# Patient Record
Sex: FEMALE | URBAN - METROPOLITAN AREA
[De-identification: names, ages, dates, MRNs, and addresses within clinical notes are randomized per-mention and may not be internally consistent; named-entity substitution may affect disease eponyms.]

---

## 2020-08-03 PROBLEM — M53.3 COCCYDYNIA: Status: ACTIVE | Noted: 2020-08-03

## 2020-11-23 ENCOUNTER — EVALUATION (OUTPATIENT)
Dept: PHYSICAL THERAPY | Age: 30
End: 2020-11-23
Payer: COMMERCIAL

## 2020-11-23 DIAGNOSIS — M70.62 TROCHANTERIC BURSITIS OF LEFT HIP: Primary | ICD-10-CM

## 2020-11-23 PROCEDURE — 97110 THERAPEUTIC EXERCISES: CPT | Performed by: PHYSICAL THERAPIST

## 2020-11-23 PROCEDURE — 97161 PT EVAL LOW COMPLEX 20 MIN: CPT | Performed by: PHYSICAL THERAPIST

## 2020-12-01 ENCOUNTER — OFFICE VISIT (OUTPATIENT)
Dept: PHYSICAL THERAPY | Age: 30
End: 2020-12-01
Payer: COMMERCIAL

## 2020-12-01 DIAGNOSIS — M70.62 TROCHANTERIC BURSITIS OF LEFT HIP: Primary | ICD-10-CM

## 2020-12-01 PROCEDURE — 97112 NEUROMUSCULAR REEDUCATION: CPT | Performed by: PHYSICAL THERAPIST

## 2020-12-01 PROCEDURE — 97140 MANUAL THERAPY 1/> REGIONS: CPT | Performed by: PHYSICAL THERAPIST

## 2020-12-01 PROCEDURE — 97110 THERAPEUTIC EXERCISES: CPT | Performed by: PHYSICAL THERAPIST

## 2020-12-03 ENCOUNTER — OFFICE VISIT (OUTPATIENT)
Dept: PHYSICAL THERAPY | Age: 30
End: 2020-12-03
Payer: COMMERCIAL

## 2020-12-03 DIAGNOSIS — M70.62 TROCHANTERIC BURSITIS OF LEFT HIP: Primary | ICD-10-CM

## 2020-12-03 PROCEDURE — 97112 NEUROMUSCULAR REEDUCATION: CPT | Performed by: PHYSICAL THERAPIST

## 2020-12-03 PROCEDURE — 97140 MANUAL THERAPY 1/> REGIONS: CPT | Performed by: PHYSICAL THERAPIST

## 2020-12-07 ENCOUNTER — OFFICE VISIT (OUTPATIENT)
Dept: PHYSICAL THERAPY | Age: 30
End: 2020-12-07
Payer: COMMERCIAL

## 2020-12-07 DIAGNOSIS — M70.62 TROCHANTERIC BURSITIS OF LEFT HIP: Primary | ICD-10-CM

## 2020-12-07 PROCEDURE — 97112 NEUROMUSCULAR REEDUCATION: CPT | Performed by: PHYSICAL THERAPIST

## 2020-12-07 PROCEDURE — 97140 MANUAL THERAPY 1/> REGIONS: CPT | Performed by: PHYSICAL THERAPIST

## 2020-12-07 PROCEDURE — 97110 THERAPEUTIC EXERCISES: CPT | Performed by: PHYSICAL THERAPIST

## 2020-12-09 ENCOUNTER — APPOINTMENT (OUTPATIENT)
Dept: PHYSICAL THERAPY | Age: 30
End: 2020-12-09
Payer: COMMERCIAL

## 2020-12-10 ENCOUNTER — OFFICE VISIT (OUTPATIENT)
Dept: PHYSICAL THERAPY | Age: 30
End: 2020-12-10
Payer: COMMERCIAL

## 2020-12-10 ENCOUNTER — TRANSCRIBE ORDERS (OUTPATIENT)
Dept: PHYSICAL THERAPY | Age: 30
End: 2020-12-10

## 2020-12-10 DIAGNOSIS — M70.62 TROCHANTERIC BURSITIS OF LEFT HIP: Primary | ICD-10-CM

## 2020-12-10 PROCEDURE — 97110 THERAPEUTIC EXERCISES: CPT | Performed by: PHYSICAL THERAPIST

## 2020-12-10 PROCEDURE — 97140 MANUAL THERAPY 1/> REGIONS: CPT | Performed by: PHYSICAL THERAPIST

## 2020-12-10 PROCEDURE — 97112 NEUROMUSCULAR REEDUCATION: CPT | Performed by: PHYSICAL THERAPIST

## 2020-12-14 ENCOUNTER — OFFICE VISIT (OUTPATIENT)
Dept: PHYSICAL THERAPY | Age: 30
End: 2020-12-14
Payer: COMMERCIAL

## 2020-12-14 DIAGNOSIS — M70.62 TROCHANTERIC BURSITIS OF LEFT HIP: Primary | ICD-10-CM

## 2020-12-14 PROCEDURE — 97140 MANUAL THERAPY 1/> REGIONS: CPT | Performed by: PHYSICAL THERAPIST

## 2020-12-14 PROCEDURE — 97110 THERAPEUTIC EXERCISES: CPT | Performed by: PHYSICAL THERAPIST

## 2020-12-14 PROCEDURE — 97112 NEUROMUSCULAR REEDUCATION: CPT | Performed by: PHYSICAL THERAPIST

## 2020-12-16 ENCOUNTER — OFFICE VISIT (OUTPATIENT)
Dept: PHYSICAL THERAPY | Age: 30
End: 2020-12-16
Payer: COMMERCIAL

## 2020-12-16 DIAGNOSIS — M70.62 TROCHANTERIC BURSITIS OF LEFT HIP: Primary | ICD-10-CM

## 2020-12-16 PROCEDURE — 97110 THERAPEUTIC EXERCISES: CPT | Performed by: PHYSICAL THERAPIST

## 2020-12-16 PROCEDURE — 97112 NEUROMUSCULAR REEDUCATION: CPT | Performed by: PHYSICAL THERAPIST

## 2020-12-21 ENCOUNTER — OFFICE VISIT (OUTPATIENT)
Dept: PHYSICAL THERAPY | Age: 30
End: 2020-12-21
Payer: COMMERCIAL

## 2020-12-21 DIAGNOSIS — M70.62 TROCHANTERIC BURSITIS OF LEFT HIP: Primary | ICD-10-CM

## 2020-12-21 PROCEDURE — 97112 NEUROMUSCULAR REEDUCATION: CPT | Performed by: PHYSICAL THERAPIST

## 2020-12-21 PROCEDURE — 97110 THERAPEUTIC EXERCISES: CPT | Performed by: PHYSICAL THERAPIST

## 2020-12-28 ENCOUNTER — APPOINTMENT (OUTPATIENT)
Dept: PHYSICAL THERAPY | Age: 30
End: 2020-12-28
Payer: COMMERCIAL

## 2020-12-29 ENCOUNTER — OFFICE VISIT (OUTPATIENT)
Dept: PHYSICAL THERAPY | Age: 30
End: 2020-12-29
Payer: COMMERCIAL

## 2020-12-29 DIAGNOSIS — M70.62 TROCHANTERIC BURSITIS OF LEFT HIP: Primary | ICD-10-CM

## 2020-12-29 PROCEDURE — 97110 THERAPEUTIC EXERCISES: CPT | Performed by: PHYSICAL THERAPIST

## 2020-12-29 PROCEDURE — 97112 NEUROMUSCULAR REEDUCATION: CPT | Performed by: PHYSICAL THERAPIST

## 2021-01-07 ENCOUNTER — OFFICE VISIT (OUTPATIENT)
Dept: PHYSICAL THERAPY | Age: 31
End: 2021-01-07
Payer: COMMERCIAL

## 2021-01-07 DIAGNOSIS — M70.62 TROCHANTERIC BURSITIS OF LEFT HIP: Primary | ICD-10-CM

## 2021-01-07 PROCEDURE — 97112 NEUROMUSCULAR REEDUCATION: CPT | Performed by: PHYSICAL THERAPIST

## 2021-01-07 PROCEDURE — 97140 MANUAL THERAPY 1/> REGIONS: CPT | Performed by: PHYSICAL THERAPIST

## 2021-01-07 PROCEDURE — 97110 THERAPEUTIC EXERCISES: CPT | Performed by: PHYSICAL THERAPIST

## 2021-01-07 NOTE — PROGRESS NOTES
Daily Note     Today's date: 2021  Patient name: Franklin Reyes  : 1990  MRN: [id number]  Referring provider: Valeria Rose DO  Dx:   Encounter Diagnosis     ICD-10-CM    1  Trochanteric bursitis of left hip  M70 62        Start Time: 1530  Stop Time: 1615  Total time in clinic (min): 45 minutes    Subjective: Pt reports cushion on hard surfaces which has helped  She reports now sitting more than prior which has caused symptoms to slightly return  Objective: See treatment diary below    Assessment: Tolerated treatment well  Reported hip fatigue post treatment  Reported reduced lateral hip discomfort following manual intervention trying to improve hip abduction     Plan: Continue per plan of care  Precautions: none     Manuals 12 29  12 21   IASTM Right sacrotuberous lig and left glute med      Anti-pron  tape      Prone hip ER iso  Medial AFJ mobs  FB    MWM hip a/p with lunge  FB                Neuro Re-Ed   12 21   TrA cuff       TrA BKFO cuff 3x10 3*10 3*10 BKFO    S/L 90/90 hip ER      TrA march cuff 3*10 3*10 3*10   Squat with EC 2*20  2*20   Standing 4 way hip TB 2*10 ea 2*10 RTB           Ther Ex   12 21   Bridge, clam       Pt ed  Quad alt arm/leg (with knee bent) 3*10, 3" 3*10, 3" ea  2*10, 3" ea     Pt edu  & assessment   FB   SI belt application  FB                       Ther Activity                    Gait Training                    Modalities

## 2021-01-12 ENCOUNTER — OFFICE VISIT (OUTPATIENT)
Dept: PHYSICAL THERAPY | Age: 31
End: 2021-01-12
Payer: COMMERCIAL

## 2021-01-12 DIAGNOSIS — M70.62 TROCHANTERIC BURSITIS OF LEFT HIP: Primary | ICD-10-CM

## 2021-01-12 PROCEDURE — 97112 NEUROMUSCULAR REEDUCATION: CPT | Performed by: PHYSICAL THERAPIST

## 2021-01-12 PROCEDURE — 97110 THERAPEUTIC EXERCISES: CPT | Performed by: PHYSICAL THERAPIST

## 2021-01-12 NOTE — PROGRESS NOTES
Daily Note     Today's date: 2021  Patient name: Dean Cardenas  : 1990  MRN: [id number]  Referring provider: Gavino Serrano DO  Dx:   Encounter Diagnosis     ICD-10-CM    1  Trochanteric bursitis of left hip  M70 62               Subjective: Pt reports feeling tightness in her left lateral hip region mainly with sitting  Objective: See treatment diary below    Assessment: Tolerated treatment well  Pt reported hip felt better with towel roll placed under thigh for sitting postural correction  Fatigue reported with addition of resisted side stepping  Plan: Continue per plan of care  Precautions: none     Manuals 12 29   12 21   Medial AFJ mobs  FB      MWM hip a/p with lunge  FB                      Neuro Re-Ed 12 29  1 12  12 21    TrA BKFO cuff 3x10 3*10 progressed  3*10 BKFO   TrA march cuff 3*10 3*10 3*10  3*10   Standing 4 way hip TB 2*10 ea 2*10 RTB      TrA SLR flex   3*10 ea with cuff                      Ther Ex 12 29  1 12  12 21   Bridge, clam        Quad alt arm/leg (with knee bent) 3*10, 3" 3*10, 3" ea  Prone knee bent hip extn 3*10  2*10, 3" ea     Pt edu  & assessment   5'  FB   SI belt application  FB      Quad rock   3*10     Resisted side step   6*10', RTB              Ther Activity                          Gait Training                          Modalities

## 2021-01-14 ENCOUNTER — OFFICE VISIT (OUTPATIENT)
Dept: PHYSICAL THERAPY | Age: 31
End: 2021-01-14
Payer: COMMERCIAL

## 2021-01-14 DIAGNOSIS — M70.62 TROCHANTERIC BURSITIS OF LEFT HIP: Primary | ICD-10-CM

## 2021-01-14 PROCEDURE — 97110 THERAPEUTIC EXERCISES: CPT | Performed by: PHYSICAL THERAPIST

## 2021-01-14 PROCEDURE — 97140 MANUAL THERAPY 1/> REGIONS: CPT | Performed by: PHYSICAL THERAPIST

## 2021-01-14 PROCEDURE — 97112 NEUROMUSCULAR REEDUCATION: CPT | Performed by: PHYSICAL THERAPIST

## 2021-01-14 NOTE — PROGRESS NOTES
Daily Note     Today's date: 2021  Patient name: Camelia Ha  : 1990  MRN: [id number]  Referring provider: Georgina Ghosh DO  Dx:   Encounter Diagnosis     ICD-10-CM    1  Trochanteric bursitis of left hip  M70 62      Start Time: 1015  Stop Time: 1100  Total time in clinic (min): 45 minutes  Subjective: Pt reports still having discomfort when lying on her left side  Objective: See treatment diary below    Assessment: Tolerated treatment well  Pt reported improve in symptoms with addition of manual therapy technique today  Plan: Continue per plan of care  Precautions: none     Manuals 12 29  12 21   Medial AFJ mobs  FB      MWM hip a/p with lunge  FB      NP-STM greater trochanter    FB followed by [facility]                                    Neuro Re-Ed 12 29  1 12 1 14 12 21   TrA march cuff 3*10 3*10 3*10 3*10 3*10   Standing 4 way hip TB 2*10 ea 2*10 RTB      TrA SLR flex   3*10 ea with cuff 3*10 ea with cuff                     Ther Ex 12 29  1 12 1 14 12 21   Bridge, clam        Quad alt arm/leg (with knee bent) 3*10, 3" 3*10, 3" ea  Prone knee bent hip extn 3*10 Prone knee bent hip extn 3*10 ea  2*10, 3" ea     Pt edu  & assessment   5'  FB   SI belt application  FB      Quad rock   3*10 2*10    Resisted side step   6*10', RTB 3 laps 20' total RTB             Ther Activity                          Gait Training                          Modalities

## 2021-01-21 ENCOUNTER — EVALUATION (OUTPATIENT)
Dept: PHYSICAL THERAPY | Age: 31
End: 2021-01-21
Payer: COMMERCIAL

## 2021-01-21 DIAGNOSIS — M70.62 TROCHANTERIC BURSITIS OF LEFT HIP: Primary | ICD-10-CM

## 2021-01-21 PROCEDURE — 97112 NEUROMUSCULAR REEDUCATION: CPT | Performed by: PHYSICAL THERAPIST

## 2021-01-21 PROCEDURE — 97530 THERAPEUTIC ACTIVITIES: CPT | Performed by: PHYSICAL THERAPIST

## 2021-01-21 PROCEDURE — 97110 THERAPEUTIC EXERCISES: CPT | Performed by: PHYSICAL THERAPIST

## 2021-01-21 PROCEDURE — 97140 MANUAL THERAPY 1/> REGIONS: CPT | Performed by: PHYSICAL THERAPIST

## 2021-01-21 NOTE — LETTER
2021    Christian Duron DO  [address] [facility]    Patient: Paola Blood   YOB: 1990   Date of Visit: 2021     Encounter Diagnosis     ICD-10-CM    1  Trochanteric bursitis of left hip  M70 62        Dear Dr Bossman Nugent:    Thank you for your recent referral of Paola Blood  Please review the attached evaluation summary from Malu's recent visit  Please verify that you agree with the plan of care by signing the attached order  If you have any questions or concerns, please do not hesitate to call  I sincerely appreciate the opportunity to share in the care of one of your patients and hope to have another opportunity to work with you in the near future  Sincerely,    Bennett Villanueva, PT      Referring Provider:      I certify that I have read the below Plan of Care and certify the need for these services furnished under this plan of treatment while under my care  Christian Duron DO  [phone] [id number]  Via Fax: [phone]          Re-evaluation    Today's date: 2021  Patient name: Paola Blood  : 1990  MRN: [id number]  Referring provider: Keyanna Cheema DO  Dx:   Encounter Diagnosis     ICD-10-CM    1  Trochanteric bursitis of left hip  M70 62      Start Time: 4046  Stop Time: 1230  Total time in clinic (min): 45 minutes  Subjective: Pt reports that she is about 65% improved  She reports sitting for long periods of time will still irritate her and need to stretch out when getting out of car       Objective: See treatment diary below    Pain  Current pain ratin  At best pain ratin  At worst pain ratin  Quality: pressure, throbbing and dull ache  Relieving factors: ice  Progression: no change     Treatments  Previous treatment: chiropractic and medication  Current treatment: chiropractic  Patient Goals  Patient goals for therapy: decreased pain  Patient goal: improve transtions, gardening, no pain with stair navigation, 60% improved      Short Term:  1  Pt will report decreased levels of pain by at least 2 subjective ratings in 4 weeks  met  2  Pt will demonstrate improved ROM by at least 10 degrees in 4 weeks  met  3  Pt will demonstrate improved strength by 1/2 grade in 4 weeks  met  4  Pt will be able to rise from seated position without pain in 4 weeks  Not met  Long Term:   1  Pt will be independent in their HEP in 8 weeks  2  Pt will be pain free with IADL's in 8 weeks  3  Pt will demonstrate improved FOTO, > 28 in 8 weeks  Not met  4  Pt will be able to walk >20 minutes without pain in 8 weeks  met      Objective   GAIT: unremarkable  Squat assess: WNL  ¼ squat assess: -  SLS: RLE: WNL,   LLE: WNL, painful  Heel toe walk: -     Lumbar  % of normal   Flex  100   Extn  100   SB Left 100   SB Right 100   ROT Left 100   ROT Right 100      Repetitive testing: extension in standing= no change  extension in lying= no change    flexion standing= no change flexion in lying= no change            MMT     Hip       L       R   Flex  4 5   Extn  4 5   Abd  4 5   Add  5 5   IR  4 4   ER  4- 4           G  Max 4 5   G  Med  3- 3+   Iliop  3+ 4       Normal hip ROM, painful passive hip IR                MMT     Knee         L        R   Flex  5 5   Extn  5 5                        MMT     Ankle       L        R   PF 5 5   DF  5 5   EHL 5 5   Inv  5 5   Ever  5 5      Posture: no effect  Palpation: TFL  Dermatome: deferred  Slump test: L= -    R=    -   Straight leg raise:   L=  -   R=   -            Transverse abdominis: Bent knee fall out= good supine march= fair      Hip/SI joint:   Scour= -      johann =  -   capsular mobility=  -      long axis distraction (hip) =   -   Active SLR=   +        Active SLR with post hip mobilization= +    Leg length = -    Hamstring dominance test=   +      Hip abd/lat rot  =  +  Multifidus activation = fair    Assessment: Tolerated treatment well   Patient continues to demonstrate signs of anterior hip musculare compensation with femoral glide leading to corrina-lateral hip pain  Moderate cueing needed for correct performance for all exercises to reduce TFL compensation  Patient educated today on ascending steps mechanics to avoid hip IR which helped improve symptoms  She will benefit from continuation of treatment to address functional deficits  Patient will be returning to MD for follow-up with possible injection to be discussed at that time  Plan: 6 weeks, 1-2 visits per week progressing toward D/C  Precautions: none  HEP: Access Code: Unruly Bowling   URL: [email or web address]   Date: 01/21/2021    Manuals 1/7 1/12 1/14 1/21   Lateral hip mobs FB   FB   MWM hip a/p with lunge FB      NP-STM greater trochanter   FB followed by [facility]                                Neuro Re-Ed  1 12 1 14 1 21   TrA march cuff 3*10 3*10 3*10    Standing 4 way hip TB 2*10 RTB      TrA SLR flex  3*10 ea with cuff 3*10 ea with cuff    Seated hip flexion with ER    2*10, 6"   Seated hip ER AROM    2*30''                                Ther Ex  1 12 1 14 1 21   Clam shell    3*15   Quad alt arm/leg (with knee bent) 3*10, 3" ea  Prone knee bent hip extn 3*10 Prone knee bent hip extn 3*10 ea      Pt edu  & assessment  5'     SI belt application FB      Quad rock  3*10 2*10 20x   Resisted side step  6*10', RTB 3 laps 20' total RTB            Ther Activity        step up mechanics    8'           Gait Training                       Modalities

## 2021-01-21 NOTE — PROGRESS NOTES
Re-evaluation    Today's date: 2021  Patient name: Dede Carlisle  : 1990  MRN: [id number]  Referring provider: Landen Davis DO  Dx:   Encounter Diagnosis     ICD-10-CM    1  Trochanteric bursitis of left hip  M70 62      Start Time: 1025  Stop Time: 1230  Total time in clinic (min): 45 minutes  Subjective: Pt reports that she is about 65% improved  She reports sitting for long periods of time will still irritate her and need to stretch out when getting out of car  Objective: See treatment diary below    Pain  Current pain ratin  At best pain ratin  At worst pain ratin  Quality: pressure, throbbing and dull ache  Relieving factors: ice  Progression: no change     Treatments  Previous treatment: chiropractic and medication  Current treatment: chiropractic  Patient Goals  Patient goals for therapy: decreased pain  Patient goal: improve transtions, gardening, no pain with stair navigation, 60% improved      Short Term:  1  Pt will report decreased levels of pain by at least 2 subjective ratings in 4 weeks  met  2  Pt will demonstrate improved ROM by at least 10 degrees in 4 weeks  met  3  Pt will demonstrate improved strength by 1/2 grade in 4 weeks  met  4  Pt will be able to rise from seated position without pain in 4 weeks  Not met  Long Term:   1  Pt will be independent in their HEP in 8 weeks  2  Pt will be pain free with IADL's in 8 weeks  3  Pt will demonstrate improved FOTO, > 28 in 8 weeks  Not met  4  Pt will be able to walk >20 minutes without pain in 8 weeks  met      Objective   GAIT: unremarkable  Squat assess: WNL  ¼ squat assess: -  SLS: RLE: WNL,   LLE: WNL, painful  Heel toe walk: -     Lumbar  % of normal   Flex  100   Extn   100   SB Left 100   SB Right 100   ROT Left 100   ROT Right 100      Repetitive testing: extension in standing= no change  extension in lying= no change    flexion standing= no change flexion in lying= no change            MMT     Hip       L R   Flex  4 5   Extn  4 5   Abd  4 5   Add  5 5   IR  4 4   ER  4- 4           G  Max 4 5   G  Med  3- 3+   Iliop  3+ 4       Normal hip ROM, painful passive hip IR                MMT     Knee         L        R   Flex  5 5   Extn  5 5                        MMT     Ankle       L        R   PF 5 5   DF  5 5   EHL 5 5   Inv  5 5   Ever  5 5      Posture: no effect  Palpation: TFL  Dermatome: deferred  Slump test: L= -    R=    -   Straight leg raise:   L=  -   R=   -            Transverse abdominis: Bent knee fall out= good supine march= fair      Hip/SI joint:   Scour= -      johann =  -   capsular mobility=  -      long axis distraction (hip) =   -   Active SLR=   +        Active SLR with post hip mobilization= +    Leg length = -    Hamstring dominance test=   +      Hip abd/lat rot  =  +  Multifidus activation = fair    Assessment: Tolerated treatment well  Patient continues to demonstrate signs of anterior hip musculare compensation with femoral glide leading to corrina-lateral hip pain  Moderate cueing needed for correct performance for all exercises to reduce TFL compensation  Patient educated today on ascending steps mechanics to avoid hip IR which helped improve symptoms  She will benefit from continuation of treatment to address functional deficits  Patient will be returning to MD for follow-up with possible injection to be discussed at that time  Plan: 6 weeks, 1-2 visits per week progressing toward D/C  Precautions: none  HEP: Access Code: Neema Juarez   URL: [email or web address]   Date: 01/21/2021    Manuals 1/7 1/12 1/14 1/21   Lateral hip mobs FB   FB   MWM hip a/p with lunge FB      NP-STM greater trochanter   FB followed by [facility]                                Neuro Re-Ed  1 12 1 14 1 21   TrA march cuff 3*10 3*10 3*10    Standing 4 way hip TB 2*10 RTB      TrA SLR flex  3*10 ea with cuff 3*10 ea with cuff    Seated hip flexion with ER    2*10, 6"   Seated hip ER AROM    2*30''         Ther Ex  1 12 1 14 1 21   Clam shell    3*15   Quad alt arm/leg (with knee bent) 3*10, 3" ea  Prone knee bent hip extn 3*10 Prone knee bent hip extn 3*10 ea      Pt edu  & assessment  5'     SI belt application FB      Quad rock  3*10 2*10 20x   Resisted side step  6*10', RTB 3 laps 20' total RTB            Ther Activity        step up mechanics    8'           Gait Training                       Modalities

## 2021-01-22 ENCOUNTER — TRANSCRIBE ORDERS (OUTPATIENT)
Dept: PHYSICAL THERAPY | Age: 31
End: 2021-01-22

## 2021-01-22 DIAGNOSIS — M70.62 TROCHANTERIC BURSITIS OF LEFT HIP: Primary | ICD-10-CM
